# Patient Record
Sex: FEMALE | Race: WHITE | HISPANIC OR LATINO | URBAN - METROPOLITAN AREA
[De-identification: names, ages, dates, MRNs, and addresses within clinical notes are randomized per-mention and may not be internally consistent; named-entity substitution may affect disease eponyms.]

---

## 2019-12-10 ENCOUNTER — APPOINTMENT (RX ONLY)
Dept: URBAN - METROPOLITAN AREA CLINIC 116 | Facility: CLINIC | Age: 79
Setting detail: DERMATOLOGY
End: 2019-12-10

## 2019-12-10 DIAGNOSIS — L82.1 OTHER SEBORRHEIC KERATOSIS: ICD-10-CM

## 2019-12-10 DIAGNOSIS — B35.3 TINEA PEDIS: ICD-10-CM

## 2019-12-10 DIAGNOSIS — D485 NEOPLASM OF UNCERTAIN BEHAVIOR OF SKIN: ICD-10-CM

## 2019-12-10 DIAGNOSIS — D22 MELANOCYTIC NEVI: ICD-10-CM

## 2019-12-10 DIAGNOSIS — L81.4 OTHER MELANIN HYPERPIGMENTATION: ICD-10-CM

## 2019-12-10 DIAGNOSIS — L57.0 ACTINIC KERATOSIS: ICD-10-CM

## 2019-12-10 DIAGNOSIS — D18.0 HEMANGIOMA: ICD-10-CM

## 2019-12-10 PROBLEM — D18.01 HEMANGIOMA OF SKIN AND SUBCUTANEOUS TISSUE: Status: ACTIVE | Noted: 2019-12-10

## 2019-12-10 PROBLEM — D22.5 MELANOCYTIC NEVI OF TRUNK: Status: ACTIVE | Noted: 2019-12-10

## 2019-12-10 PROBLEM — D48.5 NEOPLASM OF UNCERTAIN BEHAVIOR OF SKIN: Status: ACTIVE | Noted: 2019-12-10

## 2019-12-10 PROCEDURE — ? LIQUID NITROGEN

## 2019-12-10 PROCEDURE — 11103 TANGNTL BX SKIN EA SEP/ADDL: CPT

## 2019-12-10 PROCEDURE — ? BIOPSY BY SHAVE METHOD

## 2019-12-10 PROCEDURE — 99203 OFFICE O/P NEW LOW 30 MIN: CPT | Mod: 25

## 2019-12-10 PROCEDURE — 17000 DESTRUCT PREMALG LESION: CPT | Mod: 59

## 2019-12-10 PROCEDURE — 17003 DESTRUCT PREMALG LES 2-14: CPT

## 2019-12-10 PROCEDURE — ? PRESCRIPTION

## 2019-12-10 PROCEDURE — 11102 TANGNTL BX SKIN SINGLE LES: CPT

## 2019-12-10 PROCEDURE — ? COUNSELING

## 2019-12-10 RX ORDER — KETOCONAZOLE 20 MG/G
1 APP CREAM TOPICAL BID
Qty: 1 | Refills: 1 | Status: ERX | COMMUNITY
Start: 2019-12-10

## 2019-12-10 RX ADMIN — KETOCONAZOLE 1 APP: 20 CREAM TOPICAL at 00:00

## 2019-12-10 ASSESSMENT — LOCATION SIMPLE DESCRIPTION DERM
LOCATION SIMPLE: LEFT CHEEK
LOCATION SIMPLE: LEFT 5TH TOE
LOCATION SIMPLE: RIGHT LIP
LOCATION SIMPLE: LEFT FOREHEAD
LOCATION SIMPLE: RIGHT 5TH TOE
LOCATION SIMPLE: ABDOMEN
LOCATION SIMPLE: RIGHT FOREHEAD
LOCATION SIMPLE: LEFT FOOT
LOCATION SIMPLE: RIGHT FOOT
LOCATION SIMPLE: RIGHT UPPER BACK

## 2019-12-10 ASSESSMENT — LOCATION DETAILED DESCRIPTION DERM
LOCATION DETAILED: LEFT SUPERIOR FOREHEAD
LOCATION DETAILED: RIGHT UPPER CUTANEOUS LIP
LOCATION DETAILED: 1ST WEBSPACE LEFT FOOT
LOCATION DETAILED: RIGHT MEDIAL FOREHEAD
LOCATION DETAILED: LEFT SUPERIOR MEDIAL FOREHEAD
LOCATION DETAILED: LEFT MEDIAL 5TH TOE
LOCATION DETAILED: LEFT SUPERIOR CENTRAL BUCCAL CHEEK
LOCATION DETAILED: RIGHT MEDIAL UPPER BACK
LOCATION DETAILED: EPIGASTRIC SKIN
LOCATION DETAILED: RIGHT MEDIAL 5TH TOE
LOCATION DETAILED: 1ST WEBSPACE RIGHT FOOT

## 2019-12-10 ASSESSMENT — LOCATION ZONE DERM
LOCATION ZONE: TOE
LOCATION ZONE: LIP
LOCATION ZONE: FEET
LOCATION ZONE: FACE
LOCATION ZONE: TRUNK

## 2020-01-16 ENCOUNTER — APPOINTMENT (RX ONLY)
Dept: URBAN - METROPOLITAN AREA CLINIC 123 | Facility: CLINIC | Age: 80
Setting detail: DERMATOLOGY
End: 2020-01-16

## 2020-01-16 PROBLEM — C44.01 BASAL CELL CARCINOMA OF SKIN OF LIP: Status: ACTIVE | Noted: 2020-01-16

## 2020-01-16 PROCEDURE — ? CONSULTATION FOR MOHS SURGERY

## 2020-01-16 PROCEDURE — 99212 OFFICE O/P EST SF 10 MIN: CPT

## 2020-02-20 ENCOUNTER — APPOINTMENT (RX ONLY)
Dept: URBAN - METROPOLITAN AREA CLINIC 123 | Facility: CLINIC | Age: 80
Setting detail: DERMATOLOGY
End: 2020-02-20

## 2020-02-20 PROBLEM — C44.01 BASAL CELL CARCINOMA OF SKIN OF LIP: Status: ACTIVE | Noted: 2020-02-20

## 2020-02-20 PROCEDURE — 17312 MOHS ADDL STAGE: CPT

## 2020-02-20 PROCEDURE — 17311 MOHS 1 STAGE H/N/HF/G: CPT

## 2020-02-20 PROCEDURE — ? MOHS SURGERY

## 2020-02-20 PROCEDURE — ? ADDITIONAL NOTES

## 2020-02-20 NOTE — PROCEDURE: MOHS SURGERY
Duration Of Freeze Thaw-Cycle (Seconds): 0 Detail Level: Simple Number Of Freeze-Thaw Cycles: 3 freeze-thaw cycles Post-Care Instructions: I reviewed with the patient in detail post-care instructions. Patient is to wear sunprotection, and avoid picking at any of the treated lesions. Pt may apply Vaseline to crusted or scabbing areas. Render Post-Care Instructions In Note?: no Consent: The patient's consent was obtained including but not limited to risks of crusting, scabbing, blistering, scarring, darker or lighter pigmentary change, recurrence, incomplete removal and infection. Dressing: dry sterile dressing

## 2023-06-30 NOTE — PROCEDURE: MOHS SURGERY
Recorded Pressure: Ao, HR=63, Condition=Condition 1 (Aorta) Ao 159/64/97 Surgical Defect Length In Cm (Optional): 1.3